# Patient Record
Sex: FEMALE | URBAN - METROPOLITAN AREA
[De-identification: names, ages, dates, MRNs, and addresses within clinical notes are randomized per-mention and may not be internally consistent; named-entity substitution may affect disease eponyms.]

---

## 2017-09-29 ENCOUNTER — APPOINTMENT (RX ONLY)
Dept: URBAN - METROPOLITAN AREA CLINIC 134 | Facility: CLINIC | Age: 82
Setting detail: DERMATOLOGY
End: 2017-09-29

## 2017-09-29 DIAGNOSIS — L82.1 OTHER SEBORRHEIC KERATOSIS: ICD-10-CM

## 2017-09-29 DIAGNOSIS — L28.1 PRURIGO NODULARIS: ICD-10-CM

## 2017-09-29 PROBLEM — J44.9 CHRONIC OBSTRUCTIVE PULMONARY DISEASE, UNSPECIFIED: Status: ACTIVE | Noted: 2017-09-29

## 2017-09-29 PROBLEM — H91.90 UNSPECIFIED HEARING LOSS, UNSPECIFIED EAR: Status: ACTIVE | Noted: 2017-09-29

## 2017-09-29 PROCEDURE — ? COUNSELING

## 2017-09-29 PROCEDURE — 99202 OFFICE O/P NEW SF 15 MIN: CPT

## 2017-09-29 ASSESSMENT — LOCATION DETAILED DESCRIPTION DERM
LOCATION DETAILED: RIGHT ELBOW
LOCATION DETAILED: LEFT ELBOW
LOCATION DETAILED: LEFT INFERIOR CENTRAL MALAR CHEEK

## 2017-09-29 ASSESSMENT — LOCATION SIMPLE DESCRIPTION DERM
LOCATION SIMPLE: LEFT ELBOW
LOCATION SIMPLE: RIGHT ELBOW
LOCATION SIMPLE: LEFT CHEEK

## 2017-09-29 ASSESSMENT — LOCATION ZONE DERM
LOCATION ZONE: ARM
LOCATION ZONE: FACE

## 2017-09-29 NOTE — PROCEDURE: MIPS QUALITY
Quality 131: Pain Assessment And Follow-Up: Pain assessment using a standardized tool is documented as negative, no follow-up plan required
Quality 226: Preventive Care And Screening: Tobacco Use: Screening And Cessation Intervention: Patient screened for tobacco and never smoked
Quality 47: Advance Care Plan: Advance Care Planning discussed and documented in the medical record; patient did not wish or was not able to name a surrogate decision maker or provide an advance care plan.
Quality 111:Pneumonia Vaccination Status For Older Adults: Pneumococcal Vaccination Previously Received
Detail Level: Detailed
Quality 110: Preventive Care And Screening: Influenza Immunization: Influenza Immunization previously received during influenza season
Quality 130: Documentation Of Current Medications In The Medical Record: Current Medications Documented

## 2017-10-25 ENCOUNTER — ESTABLISHED COMPREHENSIVE EXAM (OUTPATIENT)
Dept: URBAN - METROPOLITAN AREA CLINIC 46 | Facility: CLINIC | Age: 82
End: 2017-10-25

## 2017-10-25 DIAGNOSIS — Z96.1: ICD-10-CM

## 2017-10-25 DIAGNOSIS — H04.203: ICD-10-CM

## 2017-10-25 DIAGNOSIS — H35.30: ICD-10-CM

## 2017-10-25 DIAGNOSIS — H43.813: ICD-10-CM

## 2017-10-25 PROCEDURE — 1036F TOBACCO NON-USER: CPT

## 2017-10-25 PROCEDURE — 92015 DETERMINE REFRACTIVE STATE: CPT

## 2017-10-25 PROCEDURE — G8785 BP SCRN NO PERF AT INTERVAL: HCPCS

## 2017-10-25 PROCEDURE — G8427 DOCREV CUR MEDS BY ELIG CLIN: HCPCS

## 2017-10-25 PROCEDURE — 92014 COMPRE OPH EXAM EST PT 1/>: CPT

## 2017-10-25 PROCEDURE — 4177F TALK PT/CRGVR RE AREDS PREV: CPT

## 2017-10-25 PROCEDURE — 2019F DILATED MACUL EXAM DONE: CPT

## 2017-10-25 ASSESSMENT — VISUAL ACUITY
OS_SC: J5
OS_CC: 20/40-1
OD_SC: 20/70+2
OD_SC: J3
OS_SC: 20/60-1
OD_CC: 20/60+3

## 2017-10-25 ASSESSMENT — TONOMETRY
OS_IOP_MMHG: 15
OD_IOP_MMHG: 16

## 2017-11-10 ENCOUNTER — ESTABLISHED COMPREHENSIVE EXAM (OUTPATIENT)
Dept: URBAN - METROPOLITAN AREA CLINIC 43 | Facility: CLINIC | Age: 82
End: 2017-11-10

## 2017-11-10 VITALS — HEART RATE: 87 BPM | HEIGHT: 55 IN | SYSTOLIC BLOOD PRESSURE: 117 MMHG | DIASTOLIC BLOOD PRESSURE: 66 MMHG

## 2017-11-10 DIAGNOSIS — Z96.1: ICD-10-CM

## 2017-11-10 DIAGNOSIS — H43.813: ICD-10-CM

## 2017-11-10 DIAGNOSIS — H35.3211: ICD-10-CM

## 2017-11-10 DIAGNOSIS — H35.3122: ICD-10-CM

## 2017-11-10 DIAGNOSIS — Z98.890: ICD-10-CM

## 2017-11-10 PROCEDURE — 67028 INJECTION EYE DRUG: CPT

## 2017-11-10 PROCEDURE — G8427 DOCREV CUR MEDS BY ELIG CLIN: HCPCS

## 2017-11-10 PROCEDURE — 9222550 BILAT EXTENDED OPHTHALMOSCOPY, FIRST

## 2017-11-10 PROCEDURE — 92014 COMPRE OPH EXAM EST PT 1/>: CPT

## 2017-11-10 PROCEDURE — 2019F DILATED MACUL EXAM DONE: CPT

## 2017-11-10 PROCEDURE — J3490AVA AVASTIN

## 2017-11-10 PROCEDURE — G8752 SYS BP LESS 140: HCPCS

## 2017-11-10 PROCEDURE — G8754 DIAS BP LESS 90: HCPCS

## 2017-11-10 PROCEDURE — 92134 CPTRZ OPH DX IMG PST SGM RTA: CPT

## 2017-11-10 PROCEDURE — 4177F TALK PT/CRGVR RE AREDS PREV: CPT

## 2017-11-10 ASSESSMENT — VISUAL ACUITY
OS_CC: 20/60-2
OS_PH: 20/50
OS_CC: J2
OD_PH: 20/50-2
OD_CC: J2
OD_CC: 20/60-2+1

## 2017-11-10 ASSESSMENT — TONOMETRY
OD_IOP_MMHG: 17
OS_IOP_MMHG: 15

## 2017-12-13 ENCOUNTER — ESTABLISHED PATIENT (OUTPATIENT)
Dept: URBAN - METROPOLITAN AREA CLINIC 43 | Facility: CLINIC | Age: 82
End: 2017-12-13

## 2017-12-13 DIAGNOSIS — H35.3211: ICD-10-CM

## 2017-12-13 PROCEDURE — 67028 INJECTION EYE DRUG: CPT

## 2017-12-13 PROCEDURE — J3490AVA AVASTIN

## 2017-12-13 ASSESSMENT — TONOMETRY: OD_IOP_MMHG: 16

## 2017-12-13 ASSESSMENT — VISUAL ACUITY
OS_CC: 20/40-2
OD_CC: 20/40+1

## 2018-01-10 ENCOUNTER — ESTABLISHED PATIENT (OUTPATIENT)
Dept: URBAN - METROPOLITAN AREA CLINIC 43 | Facility: CLINIC | Age: 83
End: 2018-01-10

## 2018-01-10 DIAGNOSIS — H35.3211: ICD-10-CM

## 2018-01-10 PROCEDURE — J3490AVA AVASTIN

## 2018-01-10 PROCEDURE — 67028 INJECTION EYE DRUG: CPT

## 2018-01-10 ASSESSMENT — VISUAL ACUITY
OS_CC: 20/40-1
OD_CC: 20/40-1

## 2018-01-10 ASSESSMENT — TONOMETRY: OD_IOP_MMHG: 15

## 2018-02-12 ENCOUNTER — ESTABLISHED PATIENT (OUTPATIENT)
Dept: URBAN - METROPOLITAN AREA CLINIC 36 | Facility: CLINIC | Age: 83
End: 2018-02-12

## 2018-02-12 DIAGNOSIS — H35.363: ICD-10-CM

## 2018-02-12 DIAGNOSIS — H35.3122: ICD-10-CM

## 2018-02-12 DIAGNOSIS — H43.813: ICD-10-CM

## 2018-02-12 DIAGNOSIS — H35.3211: ICD-10-CM

## 2018-02-12 PROCEDURE — 4177F TALK PT/CRGVR RE AREDS PREV: CPT

## 2018-02-12 PROCEDURE — J3490AVA AVASTIN

## 2018-02-12 PROCEDURE — 1036F TOBACCO NON-USER: CPT

## 2018-02-12 PROCEDURE — G8427 DOCREV CUR MEDS BY ELIG CLIN: HCPCS

## 2018-02-12 PROCEDURE — 2019F DILATED MACUL EXAM DONE: CPT

## 2018-02-12 PROCEDURE — 92014 COMPRE OPH EXAM EST PT 1/>: CPT

## 2018-02-12 PROCEDURE — G8756 NO BP MEASURE DOC: HCPCS

## 2018-02-12 PROCEDURE — 67028 INJECTION EYE DRUG: CPT

## 2018-02-12 PROCEDURE — 92134 CPTRZ OPH DX IMG PST SGM RTA: CPT

## 2018-02-12 ASSESSMENT — VISUAL ACUITY
OS_CC: 20/40-2+2
OD_CC: 20/40-1+1

## 2018-02-12 ASSESSMENT — TONOMETRY
OD_IOP_MMHG: 18
OS_IOP_MMHG: 16

## 2018-03-26 ENCOUNTER — ESTABLISHED PATIENT (OUTPATIENT)
Dept: URBAN - METROPOLITAN AREA CLINIC 36 | Facility: CLINIC | Age: 83
End: 2018-03-26

## 2018-03-26 DIAGNOSIS — H35.3122: ICD-10-CM

## 2018-03-26 DIAGNOSIS — H43.813: ICD-10-CM

## 2018-03-26 DIAGNOSIS — H35.3212: ICD-10-CM

## 2018-03-26 DIAGNOSIS — H35.363: ICD-10-CM

## 2018-03-26 DIAGNOSIS — Z96.1: ICD-10-CM

## 2018-03-26 PROCEDURE — 92226 OPHTHALMOSCOPY (SUB): CPT

## 2018-03-26 PROCEDURE — 4177F TALK PT/CRGVR RE AREDS PREV: CPT

## 2018-03-26 PROCEDURE — J3490AVA AVASTIN

## 2018-03-26 PROCEDURE — 1036F TOBACCO NON-USER: CPT

## 2018-03-26 PROCEDURE — 67028 INJECTION EYE DRUG: CPT

## 2018-03-26 PROCEDURE — 2019F DILATED MACUL EXAM DONE: CPT

## 2018-03-26 PROCEDURE — G8756 NO BP MEASURE DOC: HCPCS

## 2018-03-26 PROCEDURE — 92012 INTRM OPH EXAM EST PATIENT: CPT

## 2018-03-26 PROCEDURE — G8427 DOCREV CUR MEDS BY ELIG CLIN: HCPCS

## 2018-03-26 PROCEDURE — 92134 CPTRZ OPH DX IMG PST SGM RTA: CPT

## 2018-03-26 ASSESSMENT — VISUAL ACUITY
OS_SC: 20/40
OD_SC: 20/40+2

## 2018-03-26 ASSESSMENT — TONOMETRY
OD_IOP_MMHG: 16
OS_IOP_MMHG: 14

## 2018-05-21 ENCOUNTER — ESTABLISHED PATIENT (OUTPATIENT)
Dept: URBAN - METROPOLITAN AREA CLINIC 36 | Facility: CLINIC | Age: 83
End: 2018-05-21

## 2018-05-21 DIAGNOSIS — H35.363: ICD-10-CM

## 2018-05-21 DIAGNOSIS — H43.813: ICD-10-CM

## 2018-05-21 DIAGNOSIS — H35.3122: ICD-10-CM

## 2018-05-21 DIAGNOSIS — Z98.890: ICD-10-CM

## 2018-05-21 DIAGNOSIS — H35.3212: ICD-10-CM

## 2018-05-21 PROCEDURE — 92012 INTRM OPH EXAM EST PATIENT: CPT

## 2018-05-21 PROCEDURE — 2019F DILATED MACUL EXAM DONE: CPT

## 2018-05-21 PROCEDURE — 92134 CPTRZ OPH DX IMG PST SGM RTA: CPT

## 2018-05-21 PROCEDURE — 4177F TALK PT/CRGVR RE AREDS PREV: CPT

## 2018-05-21 PROCEDURE — J3490AVA AVASTIN

## 2018-05-21 PROCEDURE — G8756 NO BP MEASURE DOC: HCPCS

## 2018-05-21 PROCEDURE — 1036F TOBACCO NON-USER: CPT

## 2018-05-21 PROCEDURE — G8427 DOCREV CUR MEDS BY ELIG CLIN: HCPCS

## 2018-05-21 PROCEDURE — 9222650 BILAT EXTENDED OPHTHALMOSCOPY, F/U

## 2018-05-21 PROCEDURE — 67028 INJECTION EYE DRUG: CPT

## 2018-05-21 ASSESSMENT — VISUAL ACUITY
OD_CC: 20/40+2
OS_CC: 20/40+1

## 2018-05-21 ASSESSMENT — TONOMETRY
OD_IOP_MMHG: 15
OS_IOP_MMHG: 14

## 2018-08-06 ENCOUNTER — ESTABLISHED PATIENT (OUTPATIENT)
Dept: URBAN - METROPOLITAN AREA CLINIC 36 | Facility: CLINIC | Age: 83
End: 2018-08-06

## 2018-08-06 DIAGNOSIS — Z98.890: ICD-10-CM

## 2018-08-06 DIAGNOSIS — H43.813: ICD-10-CM

## 2018-08-06 DIAGNOSIS — H35.363: ICD-10-CM

## 2018-08-06 DIAGNOSIS — H35.3212: ICD-10-CM

## 2018-08-06 DIAGNOSIS — Z96.1: ICD-10-CM

## 2018-08-06 DIAGNOSIS — H35.3122: ICD-10-CM

## 2018-08-06 PROCEDURE — 2019F DILATED MACUL EXAM DONE: CPT

## 2018-08-06 PROCEDURE — 92134 CPTRZ OPH DX IMG PST SGM RTA: CPT

## 2018-08-06 PROCEDURE — 1036F TOBACCO NON-USER: CPT

## 2018-08-06 PROCEDURE — G9974 MAC EXAM PERF: HCPCS

## 2018-08-06 PROCEDURE — 9222650 BILAT EXTENDED OPHTHALMOSCOPY, F/U

## 2018-08-06 PROCEDURE — G8756 NO BP MEASURE DOC: HCPCS

## 2018-08-06 PROCEDURE — G9903 PT SCRN TBCO ID AS NON USER: HCPCS

## 2018-08-06 PROCEDURE — G8427 DOCREV CUR MEDS BY ELIG CLIN: HCPCS

## 2018-08-06 PROCEDURE — 92012 INTRM OPH EXAM EST PATIENT: CPT

## 2018-08-06 PROCEDURE — 4177F TALK PT/CRGVR RE AREDS PREV: CPT

## 2018-08-06 ASSESSMENT — VISUAL ACUITY
OS_CC: 20/40+1
OD_CC: 20/30+2

## 2018-08-06 ASSESSMENT — TONOMETRY
OS_IOP_MMHG: 16
OD_IOP_MMHG: 14

## 2018-10-15 ENCOUNTER — ESTABLISHED PATIENT (OUTPATIENT)
Dept: URBAN - METROPOLITAN AREA CLINIC 36 | Facility: CLINIC | Age: 83
End: 2018-10-15

## 2018-10-15 DIAGNOSIS — H35.3211: ICD-10-CM

## 2018-10-15 DIAGNOSIS — Z96.1: ICD-10-CM

## 2018-10-15 DIAGNOSIS — H43.813: ICD-10-CM

## 2018-10-15 DIAGNOSIS — H35.3122: ICD-10-CM

## 2018-10-15 DIAGNOSIS — H35.363: ICD-10-CM

## 2018-10-15 PROCEDURE — 1036F TOBACCO NON-USER: CPT

## 2018-10-15 PROCEDURE — G8427 DOCREV CUR MEDS BY ELIG CLIN: HCPCS

## 2018-10-15 PROCEDURE — G8756 NO BP MEASURE DOC: HCPCS

## 2018-10-15 PROCEDURE — G9974 MAC EXAM PERF: HCPCS

## 2018-10-15 PROCEDURE — 92012 INTRM OPH EXAM EST PATIENT: CPT

## 2018-10-15 PROCEDURE — 92134 CPTRZ OPH DX IMG PST SGM RTA: CPT

## 2018-10-15 PROCEDURE — 2019F DILATED MACUL EXAM DONE: CPT

## 2018-10-15 PROCEDURE — 4177F TALK PT/CRGVR RE AREDS PREV: CPT

## 2018-10-15 PROCEDURE — J3490AVA AVASTIN

## 2018-10-15 PROCEDURE — 67028 INJECTION EYE DRUG: CPT

## 2018-10-15 PROCEDURE — G9903 PT SCRN TBCO ID AS NON USER: HCPCS

## 2018-10-15 PROCEDURE — 9222650 BILAT EXTENDED OPHTHALMOSCOPY, F/U

## 2018-10-15 ASSESSMENT — TONOMETRY
OS_IOP_MMHG: 14
OD_IOP_MMHG: 15

## 2018-10-15 ASSESSMENT — VISUAL ACUITY
OD_CC: 20/40-1
OS_CC: 20/40+2

## 2018-12-17 ENCOUNTER — ESTABLISHED PATIENT (OUTPATIENT)
Dept: URBAN - METROPOLITAN AREA CLINIC 36 | Facility: CLINIC | Age: 83
End: 2018-12-17

## 2018-12-17 DIAGNOSIS — H35.3211: ICD-10-CM

## 2018-12-17 DIAGNOSIS — H35.363: ICD-10-CM

## 2018-12-17 DIAGNOSIS — Z96.1: ICD-10-CM

## 2018-12-17 DIAGNOSIS — H35.3122: ICD-10-CM

## 2018-12-17 DIAGNOSIS — H43.813: ICD-10-CM

## 2018-12-17 DIAGNOSIS — Z98.890: ICD-10-CM

## 2018-12-17 PROCEDURE — 4177F TALK PT/CRGVR RE AREDS PREV: CPT

## 2018-12-17 PROCEDURE — 67028 INJECTION EYE DRUG: CPT

## 2018-12-17 PROCEDURE — G9903 PT SCRN TBCO ID AS NON USER: HCPCS

## 2018-12-17 PROCEDURE — 92134 CPTRZ OPH DX IMG PST SGM RTA: CPT

## 2018-12-17 PROCEDURE — J3490AVA AVASTIN

## 2018-12-17 PROCEDURE — 92012 INTRM OPH EXAM EST PATIENT: CPT

## 2018-12-17 PROCEDURE — G8756 NO BP MEASURE DOC: HCPCS

## 2018-12-17 PROCEDURE — G8427 DOCREV CUR MEDS BY ELIG CLIN: HCPCS

## 2018-12-17 PROCEDURE — 1036F TOBACCO NON-USER: CPT

## 2018-12-17 PROCEDURE — 2019F DILATED MACUL EXAM DONE: CPT

## 2018-12-17 PROCEDURE — 9222650 BILAT EXTENDED OPHTHALMOSCOPY, F/U

## 2018-12-17 ASSESSMENT — TONOMETRY
OD_IOP_MMHG: 16
OS_IOP_MMHG: 14

## 2018-12-17 ASSESSMENT — VISUAL ACUITY
OD_CC: 20/40+2
OS_CC: 20/60

## 2019-03-18 ENCOUNTER — ESTABLISHED PATIENT (OUTPATIENT)
Dept: URBAN - METROPOLITAN AREA CLINIC 36 | Facility: CLINIC | Age: 84
End: 2019-03-18

## 2019-03-18 DIAGNOSIS — H43.813: ICD-10-CM

## 2019-03-18 DIAGNOSIS — H35.3122: ICD-10-CM

## 2019-03-18 DIAGNOSIS — Z96.1: ICD-10-CM

## 2019-03-18 DIAGNOSIS — H35.363: ICD-10-CM

## 2019-03-18 DIAGNOSIS — H35.3211: ICD-10-CM

## 2019-03-18 PROCEDURE — 92134 CPTRZ OPH DX IMG PST SGM RTA: CPT

## 2019-03-18 PROCEDURE — 9222650 BILAT EXTENDED OPHTHALMOSCOPY, F/U

## 2019-03-18 PROCEDURE — 92012 INTRM OPH EXAM EST PATIENT: CPT

## 2019-03-18 ASSESSMENT — TONOMETRY
OD_IOP_MMHG: 16
OS_IOP_MMHG: 16

## 2019-03-18 ASSESSMENT — VISUAL ACUITY
OD_CC: 20/30+2
OS_CC: 20/40-1

## 2019-06-17 ENCOUNTER — ESTABLISHED PATIENT (OUTPATIENT)
Dept: URBAN - METROPOLITAN AREA CLINIC 36 | Facility: CLINIC | Age: 84
End: 2019-06-17

## 2019-06-17 DIAGNOSIS — H35.3211: ICD-10-CM

## 2019-06-17 DIAGNOSIS — H35.3122: ICD-10-CM

## 2019-06-17 PROCEDURE — 92012 INTRM OPH EXAM EST PATIENT: CPT

## 2019-06-17 PROCEDURE — 9222650 BILAT EXTENDED OPHTHALMOSCOPY, F/U

## 2019-06-17 PROCEDURE — 92134 CPTRZ OPH DX IMG PST SGM RTA: CPT

## 2019-06-17 ASSESSMENT — VISUAL ACUITY
OD_SC: 20/50
OS_SC: 20/40-1

## 2019-06-17 ASSESSMENT — TONOMETRY
OS_IOP_MMHG: 15
OD_IOP_MMHG: 15

## 2021-10-27 NOTE — PATIENT DISCUSSION
IOP elevated today. Burped ab externa incision in office today without complications - IOP down to 19mmHg. Added 1 gtt Besivance immediately after. Prescribe Simbrinza BID OD until next follow-up visit. Otherwise doing well. Continue post-operative drops as directed.

## 2021-11-01 NOTE — PATIENT DISCUSSION
-REFRACTIVE ERROR, W/ASTIGMATISM: PATIENT DECLINES HAVING THEIR REFRACTIVE ERROR SURGICALLY CORRECTED AND UNDERSTANDS THEY MAY STILL NEED SPEC RX FOR DISTANCE AND NEAR VISION.